# Patient Record
Sex: FEMALE | Race: WHITE | NOT HISPANIC OR LATINO | URBAN - METROPOLITAN AREA
[De-identification: names, ages, dates, MRNs, and addresses within clinical notes are randomized per-mention and may not be internally consistent; named-entity substitution may affect disease eponyms.]

---

## 2018-04-23 ENCOUNTER — OFFICE VISIT CONVERTED (OUTPATIENT)
Dept: SURGERY | Facility: CLINIC | Age: 63
End: 2018-04-23
Attending: SURGERY

## 2018-04-27 ENCOUNTER — OFFICE VISIT CONVERTED (OUTPATIENT)
Dept: ONCOLOGY | Facility: HOSPITAL | Age: 63
End: 2018-04-27
Attending: INTERNAL MEDICINE

## 2018-05-08 ENCOUNTER — OFFICE VISIT CONVERTED (OUTPATIENT)
Dept: ONCOLOGY | Facility: HOSPITAL | Age: 63
End: 2018-05-08
Attending: INTERNAL MEDICINE

## 2018-05-22 ENCOUNTER — OFFICE VISIT CONVERTED (OUTPATIENT)
Dept: ONCOLOGY | Facility: HOSPITAL | Age: 63
End: 2018-05-22
Attending: NURSE PRACTITIONER

## 2021-05-16 VITALS — HEIGHT: 63 IN | BODY MASS INDEX: 30.48 KG/M2 | RESPIRATION RATE: 14 BRPM | WEIGHT: 172 LBS

## 2021-05-28 VITALS
TEMPERATURE: 98.6 F | OXYGEN SATURATION: 98 % | TEMPERATURE: 98 F | DIASTOLIC BLOOD PRESSURE: 56 MMHG | DIASTOLIC BLOOD PRESSURE: 77 MMHG | BODY MASS INDEX: 29.47 KG/M2 | SYSTOLIC BLOOD PRESSURE: 100 MMHG | WEIGHT: 177.25 LBS | RESPIRATION RATE: 20 BRPM | HEIGHT: 64 IN | OXYGEN SATURATION: 98 % | WEIGHT: 172.62 LBS | BODY MASS INDEX: 31.41 KG/M2 | HEART RATE: 89 BPM | RESPIRATION RATE: 20 BRPM | SYSTOLIC BLOOD PRESSURE: 111 MMHG | HEIGHT: 63 IN | HEART RATE: 95 BPM

## 2021-05-28 VITALS
DIASTOLIC BLOOD PRESSURE: 66 MMHG | HEIGHT: 63 IN | TEMPERATURE: 98.5 F | BODY MASS INDEX: 29.3 KG/M2 | OXYGEN SATURATION: 99 % | HEART RATE: 98 BPM | SYSTOLIC BLOOD PRESSURE: 109 MMHG | WEIGHT: 165.34 LBS | RESPIRATION RATE: 16 BRPM

## 2021-05-28 NOTE — PROGRESS NOTES
Patient: JUNIOR LUJAN     Acct: [id number]     Report: #[id number]  UNIT #: [id number]     : 1955    Encounter Date:2018  PRIMARY CARE: ELEAZAR FRAIRE  ***Signed***  --------------------------------------------------------------------------------------------------------------------  Chief Complaint      2018      PANCREATIC CANCER, PANCRE            Current Plan      -Pancreatic adenocarcinoma      -T3N1M1. Clinically stage IV based on ascites.       -Will order paracentesis and PET/CT to complete staging.       -Orders placed for chemotherapy. Abraxane and Gemzar            -Interval Clinic Visit Changes      -Proceed with chemotherapy            Medical Evaluation of Cancer Associated Symptoms today      -Cancer associated pain      -Patient's pain has remained stable.      -Continue current pain regimen.      -Cancer associated muscle weakness      -Patient's weakness has remained stable.      -Continue close observation.      -Cancer associated fatigue      -Patient's fatigue has remained stable.      -Recommended exercise.      -Anxiety due to cancer      -Patient's anxiety is well controlled today.       -Continue current management.       -Today's Assessment      -ECOG 1.       -Patient's imaging exams, blood tests, physicians' notes, and any new findings     since our last visit were reviewed today to reassess patient's medical     treatment plan. .       -Old medical records were reviewed and summarized in chronological order in the     HPI today to maintain an updated medical record.       -Patient's radiology imaging tests from our last visit were reviewed     independently by me by direct visualization of the images.        -Patients current lab tests and medications were carefully reviewed to evaluate     patient's current treatment plan today.       -Patient was advised to call us right away if there are any new symptoms for an     urgent visit for further evaluation.  Patient voiced understanding and agreed to     do so.      Pancreatic cancer - C25.9            Notes      Discontinued Medications      * Megestrol Acetate (Megace ES) 625 MG/5 ML ORAL.SUSP: 625 MG PO QDAY #500       Instructions: SHAKE WELL      * Dexamethasone (Decadron) 4 MG TABLET: 4 MG PO ASDIR #30       Instructions: decadron 4mg tablets, take 2 tabs by mouth twice daily on days 2    , 3, and 4 after chemotherapy.      Time Spent:  > 50% /Coord [facility]      Patient Education Provided:  Yes            History and Present Illness      This is a very pleasant 62-year-old female with newly diagnosed stage IV     pancreatic cancer.            -March .  CT abdomen and pelvis showed a pancreatic mass measuring 3.4     cm.  Invasion into the vascular involvement.  Severe fatty changes in the     liver.  Increasing ascites.      -April .  CA-19-9 is 1150.            Vitals      Height 5 ft 3.78 in / 162 cm      Weight 172 lbs 9.923 oz / 78.3 kg      BSA 1.90 m2      BMI 29.8 kg/m2      Temperature 98 F / 36.67 C      Pulse 95      Respirations 20      Blood Pressure 100/56 Sitting, Left Arm      Pulse Oximetry 98%, RM AIR            Allergies      Coded Allergies:             PREGABALIN (Verified  Allergy, Severe, LIPS SWELL, 4/27/18)            Medications      Last Reconciled on 5/1/18 22:33 by PORSCHE GOMEZ MD      Prochlorperazine Maleate (Prochlorperazine Maleate) 10 Mg Tab      10 MG PO Q6H Y for NAUSEA AND/OR VOMITING, #60 TAB 3 Refills         Prov: Porsche Gomez         4/24/18       Ondansetron Hcl (Zofran ODT*) 8 Mg Tab.rapdis      8 MG PO Q8 for NAUSEA, #30 TAB.RAPDIS 3 Refills         Prov: Porsche Gomez         4/24/18       glyBURIDE (glyBURIDE) 5 Mg Tablet      5 MG PO QDAY, #30 TAB 0 Refills         Reported         4/23/18       Hydrocodone/Acetaminophen 5/325 MG (Hydrocodone/Acetaminophen 5/325 MG) 1 Each     Tablet      1 TAB PO Q6H Y for PAIN, TAB         Reported          4/23/18       Sucralfate (Sucralfate) 1 Gm Tablet      1 GM PO QIDAC, TAB         Reported         4/23/18       Promethazine Hcl (Phenergan*) 25 Mg Tablet      25 MG PO Q6H Y for NAUSEA, TAB 0 Refills         Reported         4/23/18       Montelukast Sodium (Singulair*) 10 Mg Tablet      10 MG PO QDAY, #30 TAB 0 Refills         Reported         4/23/18       Cholecalciferol (Vitamin D3*) 1,000 Units Capsule      1000 UNITS PO QDAY, #30 CAP 0 Refills         Reported         4/23/18       Insulin Lispro (HumaLOG VIAL*) 100 Units/Ml Vial      5 UNITS SUBQ TID INSULIN, #1 VIAL 0 Refills         Reported         4/23/18       (Iron )   No Conflict Check               Reported         4/23/18       Calcium/Vitamin D (Caltrate-D) 1 Each Tablet      600 EACH PO BID, #120 TAB         Reported         4/20/18       Potassium Chloride (Potassium Chloride) 10 Meq Capsule.er      10 MEQ PO BID, #60 CAP.ER 0 Refills         Reported         4/20/18       Furosemide* (Lasix*) 80 Mg Tablet      80 MG PO BID@09,17, #60 TAB 0 Refills         Reported         4/20/18       Pantoprazole (Protonix*) 40 Mg Tablet.dr      40 MG PO QDAY@07, #30 TAB 0 Refills         Reported         4/20/18       Lisinopril* (Lisinopril*) 30 Mg Tablet      30 MG PO QDAY, #30 TAB 0 Refills         Reported         4/20/18       Aspirin (Aspirin Baby *) 81 Mg Tab.chew      81 MG PO QDAY, #30 TAB.CHEW 0 Refills         Reported         4/20/18       LORazepam (LORazepam) 0.5 Mg Tablet      0.5 MG PO Q8H, TAB         Reported         4/20/18       Mirtazapine (Mirtazapine*) 15 Mg Tablet      15 MG PO HS, #30 TAB 0 Refills         Reported         4/20/18       Amitriptyline/Perphenazine (Amitriptyline/Perphenazine 25/2 Mg) 1 Each Tablet      1 TAB PO HS, #120 TAB         Reported         4/20/18       Simvastatin (Simvastatin*) 20 Mg Tablet      20 MG PO HS, #30 TAB 0 Refills         Reported         4/20/18       Carvedilol (Carvedilol) 12.5 Mg Tablet       12.5 MG PO BID, #60 TAB 0 Refills         Reported         4/20/18            General Information      Provider Not Found in Lookup:  ALFWINSOME      Provider Not Found in Lookup:  INDY FRAIRE            Pain and Fatigue Scales      Pain Assessment:           Experiencing any pain?:  No      Fatigue:           Experiencing any fatigue?:  Yes         Fatigue (0-10 scale):  6            PAST, FAMILY   Past Medical History      No Diabetes Type 1, Yes Diabetes Type 2, No Thyroid Disease, Yes COPD, No     Emphysema, Yes Hypertension, No Stroke, Yes High Cholesterol, Yes Heart Attack,     No Bleeding Condition, No Low or High RBC Count, No Low or High WBC Count, No     Low or High Platelet Coun, No Hepatitis, No Kidney Disease, Yes Depression, No     Alzheimer's Disease, No Mental Disease, No Seizures, No Arthritis, No     Osteoporosis, No Osteopenia, No Short of Air, Yes Sleep apnea, No Liver Disease    , No STD, No Enlarged Prostate, No Other      Hematology/oncology:  DENIES HX OF: Previous Treatment for CA, Anemia, Bladder     Cancer, Blood cancer, Brain cancer, Breast cancer, Cervical cancer, Coagulopathy    , Colorectal cancer, Endocrine cancer, Eye cancer, GI cancer,  cancer, Kidney     cancer, Leukemia, Leukocytosis, Leukopenia, Liver cancer, Lung cancer, Lymphoma    , Musculoskeletal cancer, Myeloma, Neurologic cancer, Oral cancer, Ovarian     cancer, Skin cancer, Stomach cancer, Thrombocytopenia, Thyroid cancer, Uterine     cancer, Other cancer history, Other hematologic history      Genetic/metabolic:  DENIES HX OF: Cystic fibrosis, Down syndrome, Other genetic     history, Other metabolic history            Past Surgical History      REPORTS HX OF: Cholecystectomy, Hysterectomy (PARTIAL), Biopsy (LIVER AND     PANCREAS, COLLAR BONE, HAIRLINE FRACTURE IN ANKLE), Other Past Surgical Hx (    TONSILECTOMY, TRIPLE BYPASS), DENIES HX OF: Cataract extraction, Thyroid surgery    , Lung biopsy, CABG  surgery, Coronary stent, Valve replacement, Appendectomy,     Splenectomy, Bladder surgery, Nephrectomy, Joint replacement, Frature repair,     Skin cancer removal, Melanoma excision, Spinal surgery, Breast biopsy,     Lumpectomy, Mastectomy, bilateral, Mastectomy, right, Mastectomy, left, Peg     Tube Placement, VAD Placement            Family History      REPORTS HX OF: Breast cancer (MOTHER'S SISTER), DENIES HX OF: Anemia, Blood     disorders, Blood Cancer, Cervical cancer, Coagulopathy, Colorectal cancer,     Endocrine Cancer, Eye Cancer, GI Cancer,  Cancer, Kidney Cancer, Leukemia,     Leukocytosis, Leukopenia, Liver Cancer, Lung cancer, Lymphoma, Melanoma,     Musculoskeletal Cancer, Myeloma, Neurologic Cancer, Oral Cancer, Ovarian cancer    , Prostate cancer, Skin Cancer, Stomach Cancer, Testicular Cancer,     Thrombocytopenia, Thyroid cancer, Uterine cancer, Other Cancer History, Other     Hematology History            Social History      No            Tobacco Use      Tobacco status:  Current every day smoker      Smoking packs/day:  1            Alcohol Use      Alcohol intake:  None            Substance Use      Substance use:  Denies use            REVIEW OF SYSTEMS      General:  Denies: Appetite change, Excessive sweating, Fatigue, Fever, Night     sweats, Weight gain, Weight loss, Other      Eyes:  Denies: Blurred vision, Corrective lenses, Diplopia, Eye irritation, Eye     pain, Eye redness, Spots in vision, Vision loss, Other      Ears, nose, mouth, throat:  Denies: Headache, Seizures, Visual Changes, Hearing     loss, Sinus Congestion, Hoarseness, Sore throat, Other      Cardiovascular:  Denies: Chest pain, Irregular heartbeat, Palpitations, Swollen     ankles/legs, Other      Respiratory:  Denies: Chest pain, Shortness of Air, Productive cough, Coughing     blood, Other      Gastrointestinal:  Complains of: Nausea (SOMETIMES.), Diarrhea, Denies: Vomiting    , Problem swallowing, Frequent  heartburn, Constipation, Tarry stools, Bloody     stools, Unable to control bowels, Other      Kidney/Bladder:  Denies: Painful Urination, Change in urinary stream, Blood in     urine, Incontinence, Frequent Urination, Decreased urine stream, Other      Musculoskeletal:  Denies: New Back pain, Leg Cramps, Painful Joints, Swollen     Joints, Muscle Pain, Muscle weakness, Other      Skin:  DENIES: Jaundice, Easy Bleeding, Lesions/changes in moles, Nail changes,     Skin Discoloration, Rash, Other      Neurological:  Denies: Dizziness, Fainting, Numbness\Tingling, Paralysis,     Seizures, Other      Psychiatric:  Complains of: AAO X 3, Denies: Anxiety, Panic attacks, Depression    , Memory loss, Other      Endocrine:  DiabetesThyroid DisorderOsteoporosisEndocrine Other      Hematologic/lymphatic:  Denies: Bruising, Bleeding, Enlarged Lymph Nodes,     Recurrent infections, Other      Reproductive:  Denies Pregnant, Denies Menopause, Denies Still Menstruating,     Denies Heavy Periods, Denies Other            Vitals            Vital Signs              Date Time  Temp Pulse Resp B/P (MAP) Pulse Ox O2 Delivery O2 Flow Rate FiO2             4/20/18 13:07 98.3 84  76/48 100 ROOM AIR              General Appearance:  Alert, Oriented X3, No acute distress      Eyes:  Anicteric Sclerae, Moist Conjunctiva      HEENT:  Orophraynx clear, No Erythema      Neck:  Supple, Full ROM      Respiratory:  CTAB, No Diminished Breath      Abdomen\Gastro:  Soft, No Masses      Cardio:  RRR, No Murmur, No, Peripheral Edema      Skin:  Normal Temperature, Normal Tone, No Rash, lesions, ulcers      Psychiatric:  Appropriate Affect, AAO x3      Neuro:  Cranial Nerve II-XII Inta, No Focal Sensory Deficit      Muscularskeletal:  Normal Gait and Station, Full ROM of extremeties      Extremities:  No Digital Cyanosis, No Digital Ischemia      Lymphatic:  No Cervical, No Supraclavicular, No Axillary            Lab Results      Laboratory Tests       4/20/18 14:17            PREVENTION      Hx Influenza Vaccination:  Yes (2017)      Date Influenza Vaccine Given:  Oct 1, 2017      Influenza Vaccine Declined:  No      2 or More Falls Past Year?:  No      Fall Past Year with Injury?:  No      Hx Pneumococcal Vaccination:  Yes (2017)      Encouraged to follow-up with:  PCP regarding preventative exams.      Chart initiated by      SHUKRI LEMUS MA                 Disclaimer: Converted document may not contain table formatting or lab diagrams. Please see [employer] [facility] for the authenticated document.

## 2021-05-28 NOTE — PROGRESS NOTES
Patient: JUNIOR LUJAN     Acct: [id number]     Report: #[id number]  UNIT #: [id number]     : 1955    Encounter Date:2018  PRIMARY CARE: ELEAZAR FRAIRE  ***Signed***  --------------------------------------------------------------------------------------------------------------------  Chief Complaint      May 8, 2018      PANCREATIC CANCER      STARTED FIRST CHEMO TREATMENT TODAY; PORT PLACED SINCE LAST VISIT            Current Plan      -Pancreatic adenocarcinoma      -T3N1M1. Clinically stage IV based on ascites.       -Will order paracentesis and PET/CT to complete staging.       -Doing well with chemotherapy.            -Interval Clinic Visit Changes      -Proceed with chemotherapy      -Negative toxicity evaluation            Medical Evaluation of Chemotherapy Associated Toxicities Today      -Chemotherapy associated fatigue      -Patient's weakness has remained stable.      -Recommended Exercise.       -Continue close observation.      -Chemotherapy associated nausea      -Patient's nausea has remained stable.      -Currently on Anti-Nausea medications.       -Continue close observation.      -Chemotherapy associated anemia      -Does not require blood transfusion on today's visit.       -Transfuse blood when hemoglobin is less than 7.      -Continue close observation.      -Chemotherapy associated thrombocytopenia      -Does not require platelet transfusion on today's visit.       -Transfuse platelet when platelet count < 20,000.       -Continue close observation.      -Anxiety due to cancer      -Patient's anxiety is well controlled today.       -Continue current management.       -Today's Assessment      -ECOG 1.       -Patient's imaging exams, blood tests, physicians' notes, and any new findings     since our last visit were reviewed today to reassess patient's medical     treatment plan. .       -Old medical records were reviewed and summarized in chronological order in the     HPI  today to maintain an updated medical record.       -Patient's radiology imaging tests from our last visit were reviewed     independently by me by direct visualization of the images.        -Patients current lab tests and medications were carefully reviewed to evaluate     patient's current treatment plan today. Proceed with chemotherapy plan.       -Patient was advised to call us right away if there are any new symptoms for an     urgent visit for further evaluation. Patient voiced understanding and agreed to     do so.      Time Spent:  > 50% /Coord Care      Patient Education Provided:  Yes            History and Present Illness      This is a very pleasant 62-year-old female with newly diagnosed stage IV     pancreatic cancer.            -March .  CT abdomen and pelvis showed a pancreatic mass measuring 3.4     cm.  Invasion into the vascular involvement.  Severe fatty changes in the     liver.  Increasing ascites.      -April .  CA-19-9 is 1150.      -May 8-2018.  CA-19-9 is 1837.  C1D1 Abraxane Gemzar            Vitals      Height 5 ft 2.99 in / 160.0 cm      Weight 177 lbs 3.997 oz / 80.4 kg      BSA 1.92 m2      BMI 31.4 kg/m2      Temperature 98.6 F / 37 C - Temporal      Pulse 89      Respirations 20      Blood Pressure 111/77 Sitting, Left Arm      Pulse Oximetry 98%, ROOM AIR            Allergies      Coded Allergies:             PREGABALIN (Verified  Allergy, Severe, LIPS SWELL, 5/8/18)            Medications      Last Reconciled on 5/10/18 18:46 by PORSCHE GOMEZ MD      Dexamethasone (Dexamethasone) 2 Mg Tab      4 MG PO BID, TAB         Reported         5/8/18       Loperamide HCl (Imodium) 2 Mg Capsule      2 MG PO ASDIR Y for DIARRHEA, CAP         Reported         5/2/18       Prochlorperazine Maleate (Prochlorperazine Maleate) 10 Mg Tab      10 MG PO Q6H Y for NAUSEA AND/OR VOMITING, #60 TAB 3 Refills         Prov: Porsche Gomez         4/24/18       Ondansetron Hcl (Zofran  ODT*) 8 Mg Tab.rapdis      8 MG PO Q8 for NAUSEA, #30 TAB.RAPDIS 3 Refills         Prov: Cem Gomez         4/24/18       Hydrocodone/Acetaminophen 5/325 MG (Hydrocodone/Acetaminophen 5/325 MG) 1 Each     Tablet      1 TAB PO Q6H Y for PAIN, TAB         Reported         4/23/18       Sucralfate (Sucralfate) 1 Gm Tablet      1 GM PO QIDAC, TAB         Reported         4/23/18       Promethazine Hcl (Phenergan*) 25 Mg Tablet      25 MG PO Q6H Y for NAUSEA, TAB 0 Refills         Reported         4/23/18       Montelukast Sodium (Singulair*) 10 Mg Tablet      10 MG PO QDAY, #30 TAB 0 Refills         Reported         4/23/18       Cholecalciferol (Vitamin D3*) 1,000 Units Capsule      1000 UNITS PO QDAY, #30 CAP 0 Refills         Reported         4/23/18       Insulin Lispro (HumaLOG VIAL*) 100 Units/Ml Vial      2-10 UNITS SUBQ QID INSULIN, #1 VIAL 0 Refills         Reported         4/23/18       (Iron )   No Conflict Check               Reported         4/23/18       Calcium/Vitamin D (Caltrate-D) 1 Each Tablet      600 EACH PO BID, #120 TAB         Reported         4/20/18       Potassium Chloride (Potassium Chloride) 10 Meq Capsule.er      10 MEQ PO BID, #60 CAP.ER 0 Refills         Reported         4/20/18       Furosemide* (Lasix*) 80 Mg Tablet      80 MG PO BID@09,17, #60 TAB 0 Refills         Reported         4/20/18       Pantoprazole (Protonix*) 40 Mg Tablet.dr      40 MG PO QDAY@07, #30 TAB 0 Refills         Reported         4/20/18       Aspirin (Aspirin Baby *) 81 Mg Tab.chew      81 MG PO QDAY, #30 TAB.CHEW 0 Refills         Reported         4/20/18       LORazepam (LORazepam) 0.5 Mg Tablet      0.5 MG PO Q8H, TAB         Reported         4/20/18       Mirtazapine (Mirtazapine*) 15 Mg Tablet      15 MG PO HS, #30 TAB 0 Refills         Reported         4/20/18       Amitriptyline/Perphenazine (Amitriptyline/Perphenazine 25/2 Mg) 1 Each Tablet      1 TAB PO HS, #120 TAB         Reported         4/20/18        Carvedilol (Carvedilol) 12.5 Mg Tablet      12.5 MG PO BID, #60 TAB 0 Refills         Reported         4/20/18            General Information      Provider Not Found in Lookup:  KARMEN      Provider Not Found in Lookup:  INDY FRAIRE            Pain and Fatigue Scales      Fatigue:           Experiencing any fatigue?:  Yes         Fatigue (0-10 scale):  5            PAST, FAMILY   Past Medical History      Past Medical History:  No Diabetes Type 1, Yes Diabetes Type 2, No Thyroid     Disease, Yes COPD, No Emphysema, Yes Hypertension, No Stroke, Yes High     Cholesterol, Yes Heart Attack, No Bleeding Condition, No Low or High RBC Count,     No Low or High WBC Count, No Low or High Platelet Coun, No Hepatitis, No Kidney     Disease, Yes Depression, No Alzheimer's Disease, No Mental Disease, No Seizures    , No Arthritis, No Osteoporosis, No Osteopenia, No Short of Air, Yes Sleep apnea    , No Liver Disease, No STD, No Enlarged Prostate, No Other      Hematology/oncology:  DENIES HX OF: Previous Treatment for CA, Anemia, Bladder     Cancer, Blood cancer, Brain cancer, Breast cancer, Cervical cancer, Coagulopathy    , Colorectal cancer, Endocrine cancer, Eye cancer, GI cancer,  cancer, Kidney     cancer, Leukemia, Leukocytosis, Leukopenia, Liver cancer, Lung cancer, Lymphoma    , Musculoskeletal cancer, Myeloma, Neurologic cancer, Oral cancer, Ovarian     cancer, Skin cancer, Stomach cancer, Thrombocytopenia, Thyroid cancer, Uterine     cancer, Other cancer history, Other hematologic history      Genetic/metabolic:  DENIES HX OF: Cystic fibrosis, Down syndrome, Other genetic     history, Other metabolic history            Past Surgical History      REPORTS HX OF: Cholecystectomy, Hysterectomy (PARTIAL), Biopsy (LIVER AND     PANCREAS, COLLAR BONE, HAIRLINE FRACTURE IN ANKLE), Other Past Surgical Hx (    TONSILECTOMY, TRIPLE BYPASS), DENIES HX OF: Cataract extraction, Thyroid surgery    , Lung biopsy,  CABG surgery, Coronary stent, Valve replacement, Appendectomy,     Splenectomy, Bladder surgery, Nephrectomy, Joint replacement, Frature repair,     Skin cancer removal, Melanoma excision, Spinal surgery, Breast biopsy,     Lumpectomy, Mastectomy, bilateral, Mastectomy, right, Mastectomy, left, Peg     Tube Placement, VAD Placement            Family History      REPORTS HX OF: Breast cancer (MOTHER'S SISTER), DENIES HX OF: Anemia, Blood     disorders, Blood Cancer, Cervical cancer, Coagulopathy, Colorectal cancer,     Endocrine Cancer, Eye Cancer, GI Cancer,  Cancer, Kidney Cancer, Leukemia,     Leukocytosis, Leukopenia, Liver Cancer, Lung cancer, Lymphoma, Melanoma,     Musculoskeletal Cancer, Myeloma, Neurologic Cancer, Oral Cancer, Ovarian cancer    , Prostate cancer, Skin Cancer, Stomach Cancer, Testicular Cancer,     Thrombocytopenia, Thyroid cancer, Uterine cancer, Other Cancer History, Other     Hematology History            Social History      Lives independently:  No            Tobacco Use      Tobacco status:  Current every day smoker      Smoking packs/day:  1            Alcohol Use      Alcohol intake:  None            Substance Use      Substance use:  Denies use            REVIEW OF SYSTEMS      General:  Complains of: Fatigue, Denies: Appetite change, Excessive sweating,     Fever, Night sweats, Weight gain, Weight loss, Other      Eyes:  Denies: Blurred vision, Corrective lenses, Diplopia, Eye irritation, Eye     pain, Eye redness, Spots in vision, Vision loss, Other      Ears, nose, mouth, throat:  Denies: Headache, Seizures, Visual Changes, Hearing     loss, Sinus Congestion, Hoarseness, Sore throat, Other      Cardiovascular:  Denies: Chest pain, Irregular heartbeat, Palpitations, Swollen     ankles/legs, Other      Respiratory:  Denies: Chest pain, Shortness of Air, Productive cough, Coughing     blood, Other      Gastrointestinal:  Denies: Nausea, Vomiting, Problem swallowing, Frequent      heartburn, Constipation, Diarrhea, Tarry stools, Bloody stools, Unable to     control bowels, Other      Kidney/Bladder:  Denies: Painful Urination, Change in urinary stream, Blood in     urine, Incontinence, Frequent Urination, Decreased urine stream, Other      Musculoskeletal:  Denies: New Back pain, Leg Cramps, Painful Joints, Swollen     Joints, Muscle Pain, Muscle weakness, Other      Skin:  DENIES: Jaundice, Easy Bleeding, Lesions/changes in moles, Nail changes,     Skin Discoloration, Rash, Other      Neurological:  Denies: Dizziness, Fainting, Numbness\Tingling, Paralysis,     Seizures, Other      Psychiatric:  Complains of: AAO X 3, Denies: Anxiety, Panic attacks, Depression    , Memory loss, Other      Endocrine:  DiabetesThyroid DisorderOsteoporosisEndocrine Other      Hematologic/lymphatic:  Denies: Bruising, Bleeding, Enlarged Lymph Nodes,     Recurrent infections, Other      Reproductive:  Denies Pregnant, Denies Menopause, Denies Still Menstruating,     Denies Heavy Periods, Denies Other            Vitals            Vital Signs              Date Time  Temp Pulse Resp B/P (MAP) Pulse Ox O2 Delivery O2 Flow Rate FiO2             4/27/18 13:03 98 95 20 100/56 98 RM AIR              General Appearance:  Alert, Oriented X3, No acute distress      Eyes:  Anicteric Sclerae, Moist Conjunctiva      HEENT:  Orophraynx clear, No Erythema      Neck:  Supple, Full ROM      Respiratory:  CTAB, No Diminished Breath      Abdomen\Gastro:  Soft, No Masses      Cardio:  RRR, No Murmur, No, Peripheral Edema      Skin:  Normal Temperature, Normal Tone, No Rash, lesions, ulcers      Psychiatric:  Appropriate Affect, AAO x3      Neuro:  Cranial Nerve II-XII Inta, No Focal Sensory Deficit      Muscularskeletal:  Normal Gait and Station, Full ROM of extremeties      Extremities:  No Digital Cyanosis, No Digital Ischemia      Lymphatic:  No Cervical, No Supraclavicular, No Axillary            Lab Results      Laboratory  Tests      4/20/18 14:17            PREVENTION      Hx Influenza Vaccination:  Yes      Date Influenza Vaccine Given:  Oct 1, 2017      Influenza Vaccine Declined:  No      2 or More Falls Past Year?:  No      Fall Past Year with Injury?:  No      Hx Pneumococcal Vaccination:  Yes      Encouraged to follow-up with:  PCP regarding preventative exams.      Chart initiated by      JUAN POST CMA                 Disclaimer: Converted document may not contain table formatting or lab diagrams. Please see [employer] for the authenticated document.

## 2021-05-28 NOTE — PROGRESS NOTES
Patient: JUNIOR LUJAN     Acct: [id number]     Report: #[id number]  UNIT #: [id number]     : 1955    Encounter Date:2018  PRIMARY CARE: ELEAZAR FRAIRE  ***Signed***  --------------------------------------------------------------------------------------------------------------------  Chief Complaint      May 22, 2018      PANCREATIC CANCER      STARTED FIRST CHEMO TREATMENT TODAY; PORT PLACED SINCE LAST VISIT            Current Plan      -Pancreatic adenocarcinoma      -T3N1M1. Clinically stage IV based on ascites.       -Will order paracentesis and PET/CT to complete staging.       -C1D8 held due to hospitalization            -Interval Clinic Visit Changes      -Proceed with C1D8 delayed at this time      -2 IV runs Magnesium to manage count 1.18      -Utilize Lomotil and Imodium to manage diarrhea      -Take stool sample to [facility] to test for C. diff and WBC      -Follow-up in 1 week for assess and ongoing POC            Medical Evaluation of Chemotherapy Associated Toxicities Today      -Chemotherapy associated fatigue      -Patient's weakness has remained stable.      -Recommended Exercise.       -Continue close observation.      -Chemotherapy associated nausea      -Patient's nausea has remained stable.      -Currently on Anti-Nausea medications.       -Continue close observation.      -Chemotherapy associated anemia      -Does not require blood transfusion on today's visit.       -Transfuse blood when hemoglobin is less than 7.      -Continue close observation.      -Chemotherapy associated thrombocytopenia      -Does not require platelet transfusion on today's visit.       -Transfuse platelet when platelet count < 20,000.       -Continue close observation.      -Anxiety due to cancer      -Patient's anxiety is well controlled today.       -Continue current management.       -Today's Assessment      -ECOG 1.       -Patient's imaging exams, blood tests, physicians' notes, and any new findings      since our last visit were reviewed today to reassess patient's medical     treatment plan. .       -Old medical records were reviewed and summarized in chronological order in the     HPI today to maintain an updated medical record.       -Patient's radiology imaging tests from our last visit were reviewed     independently by me by direct visualization of the images.        -Patients current lab tests and medications were carefully reviewed to evaluate     patient's current treatment plan today. Proceed with chemotherapy plan.       -Patient was advised to call us right away if there are any new symptoms for an     urgent visit for further evaluation. Patient voiced understanding and agreed to     do so.      Pancreatic cancer       Malignant neoplasm of head of pancreas - C25.0       Pancreatic malignancy location: head of pancreas            Diarrhea       Diarrhea, unspecified type - R19.7       Diarrhea type: unspecified type            Fatigue       Neoplastic malignant related fatigue - R53.0       Fatigue type: due to neoplasm            Notes      Changed Medications      * Potassium Chloride 10 MEQ CAPSULE.ER:       From: 10 MEQ PO BID #60       To: 20 MEQ PO BID #60      New Diagnostics      * Ca19-9, As Soon As Possible       Dx: Pancreatic cancer - C25.9      * Stool For Wbcs, Stat       Dx: Pancreatic cancer - C25.9      * Clostridium Diff Cul, Stat       Dx: Pancreatic cancer - C25.9      * Magnesium Serum, As Soon As Possible      Intensive Monitor for Toxicity:  Labs Reviewed, Chemo Orders Reviewd, Meds\    Narcotics Reviewed      Labs Reviewed During Visit?:  Yes      Time Spent:  > 50% /Coord Care      Patient Education Provided:  Yes      ECOG Score:  1            History and Present Illness      This is a very pleasant 62-year-old female with newly diagnosed stage IV     pancreatic cancer.            -March .  CT abdomen and pelvis showed a pancreatic mass measuring 3.4     cm.   Invasion into the vascular involvement.  Severe fatty changes in the     liver.  Increasing ascites.      -April .  CA-19-9 is 1150.      -April 26, 2018.  Paracentesis-2 liters clear, yellow fluid aspirated.       -May 8-2018.  CA-19-9 is 1837.  C1D1 Abraxane Gemzar      -May 10, 2018.  Paracentesis-2.6 liters clear, yellow fluid aspirated.      -May 22, 2018.  Presents for C1D8 delayed.  Patient reports was in hospital     last week  for dehydration and unable to receive chemotherapy.  Since that time     experiencing significant amounts of diarrhea.  Utilizing Lomotil and Imodium     and stools have decreased to 4-5 per day.  Nutrition is poor.  Has lost     approximately 10 pounds since initial treatment.  Significant education     provided regarding nutritional needs and requirements to regain strength.      Fatigue is severe; informed is related to loss of nutrients with diarrhea and     poor nutritional intake.  Mother reports she has fallen on 2 occasions.  LFA     with skin tear.  Reports some cramping pain in abdomen with diarrhea episodes.      Encouraged to utilize anti-emetics and pain med as needed to manage symptoms.      BLE with +2 pitting edema noted.  Again educated the need for protein intake.      She has compression stockings and will attempt to put them on.  Denies fever,     cough or SOA at this time. Magnesium level, 1.18. Receiving Magnesium runs x 2     day.            Vitals      Height 5 ft 2.99 in / 160 cm      Weight 165 lbs 5.520 oz / 75 kg      BSA 1.85 m2      BMI 29.3 kg/m2      Temperature 98.5 F / 36.94 C - Temporal      Pulse 98      Respirations 16      Blood Pressure 109/66 Sitting      Pulse Oximetry 99%, RM AIR            Allergies      Coded Allergies:             PREGABALIN (Verified  Allergy, Severe, LIPS SWELL, 5/22/18)            Medications      Last Reconciled on 5/22/18 11:18 by HENRY OLIVARES      Diphenoxylate/Atropine (Diphenoxylate/Atropine) 1 Each  Tablet      2.5 MG PO QID Y for DIARRHEA, TAB         Reported         5/22/18       Potassium Chloride (Potassium Chloride) 10 Meq Capsule.er      20 MEQ PO BID, #60 CAP.ER 3 Refills         Prov: HENRY OLIVARES onc         5/22/18       Insulin Degludec (Tresiba Flextouch U-100) 100 Unit/1 Ml Insuln.pen      10 UNITS SUBQ QDAY for 30 Days, #1 INSULN.PEN         Reported         5/22/18       Simvastatin (Simvastatin*) 20 Mg Tablet      20 MG PO HS, #30 TAB 0 Refills         Reported         5/22/18       Dexamethasone (Dexamethasone) 2 Mg Tab      4 MG PO BID, TAB         Reported         5/8/18       Loperamide HCl (Imodium) 2 Mg Capsule      2 MG PO ASDIR Y for DIARRHEA, CAP         Reported         5/2/18       Prochlorperazine Maleate (Prochlorperazine Maleate) 10 Mg Tab      10 MG PO Q6H Y for NAUSEA AND/OR VOMITING, #60 TAB 3 Refills         Prov: Cem Gomez         4/24/18       Ondansetron Hcl (Zofran ODT*) 8 Mg Tab.rapdis      8 MG PO Q8 for NAUSEA, #30 TAB.RAPDIS 3 Refills         Prov: Cem Gomez         4/24/18       Hydrocodone/Acetaminophen 5/325 MG (Hydrocodone/Acetaminophen 5/325 MG) 1 Each     Tablet      1 TAB PO Q6H Y for PAIN, TAB         Reported         4/23/18       Sucralfate (Sucralfate) 1 Gm Tablet      1 GM PO QIDAC, TAB         Reported         4/23/18       Promethazine Hcl (Phenergan*) 25 Mg Tablet      25 MG PO Q6H Y for NAUSEA, TAB 0 Refills         Reported         4/23/18       Montelukast Sodium (Singulair*) 10 Mg Tablet      10 MG PO QDAY, #30 TAB 0 Refills         Reported         4/23/18       Cholecalciferol (Vitamin D3*) 1,000 Units Capsule      1000 UNITS PO QDAY, #30 CAP 0 Refills         Reported         4/23/18       Insulin Lispro (HumaLOG VIAL*) 100 Units/Ml Vial      2-10 UNITS SUBQ QID INSULIN, #1 VIAL 0 Refills         Reported         4/23/18       (Iron )   No Conflict Check               Reported         4/23/18       Calcium/Vitamin D (Caltrate-D) 1  Each Tablet      600 EACH PO BID, #120 TAB         Reported         4/20/18       Furosemide* (Lasix*) 80 Mg Tablet      80 MG PO BID@09,17, #60 TAB 0 Refills         Reported         4/20/18       Pantoprazole (Protonix*) 40 Mg Tablet.dr      40 MG PO QDAY@07, #30 TAB 0 Refills         Reported         4/20/18       Aspirin (Aspirin Baby *) 81 Mg Tab.chew      81 MG PO QDAY, #30 TAB.CHEW 0 Refills         Reported         4/20/18       LORazepam (LORazepam) 0.5 Mg Tablet      0.5 MG PO Q8H, TAB         Reported         4/20/18       Mirtazapine (Mirtazapine*) 15 Mg Tablet      15 MG PO HS, #30 TAB 0 Refills         Reported         4/20/18       Amitriptyline/Perphenazine (Amitriptyline/Perphenazine 25/2 Mg) 1 Each Tablet      1 TAB PO HS, #120 TAB         Reported         4/20/18       Carvedilol (Carvedilol) 12.5 Mg Tablet      6.25 MG PO BID, #60 TAB 0 Refills         Reported         4/20/18            General Information      Provider Not Found in Lookup:  KARMEN      Provider Not Found in Lookup:  INDY FRAIRE            Pain and Fatigue Scales      Pain Assessment:           Experiencing any pain?:  No      Fatigue:           Experiencing any fatigue?:  Yes         Fatigue (0-10 scale):  10            Chemo Status      On Oral Chemotherapy?:  No            Other      Clinical Trial Participant?:  No            PAST, FAMILY   Past Medical History      Past Medical History:  No Diabetes Type 1, Yes Diabetes Type 2, No Thyroid     Disease, Yes COPD, No Emphysema, Yes Hypertension, No Stroke, Yes High     Cholesterol, Yes Heart Attack, No Bleeding Condition, No Low or High RBC Count,     No Low or High WBC Count, No Low or High Platelet Coun, No Hepatitis, No Kidney     Disease, Yes Depression, No Alzheimer's Disease, No Mental Disease, No Seizures    , No Arthritis, No Osteoporosis, No Osteopenia, No Short of Air, Yes Sleep apnea    , No Liver Disease, No STD, No Enlarged Prostate, No Other       Hematology/oncology:  DENIES HX OF: Previous Treatment for CA, Anemia, Bladder     Cancer, Blood cancer, Brain cancer, Breast cancer, Cervical cancer, Coagulopathy    , Colorectal cancer, Endocrine cancer, Eye cancer, GI cancer,  cancer, Kidney     cancer, Leukemia, Leukocytosis, Leukopenia, Liver cancer, Lung cancer, Lymphoma    , Musculoskeletal cancer, Myeloma, Neurologic cancer, Oral cancer, Ovarian     cancer, Skin cancer, Stomach cancer, Thrombocytopenia, Thyroid cancer, Uterine     cancer, Other cancer history, Other hematologic history      Genetic/metabolic:  DENIES HX OF: Cystic fibrosis, Down syndrome, Other genetic     history, Other metabolic history            Past Surgical History      REPORTS HX OF: Cholecystectomy, Hysterectomy (PARTIAL), Biopsy (LIVER AND     PANCREAS, COLLAR BONE, HAIRLINE FRACTURE IN ANKLE), Other Past Surgical Hx (    TONSILECTOMY, TRIPLE BYPASS), DENIES HX OF: Cataract extraction, Thyroid surgery    , Lung biopsy, CABG surgery, Coronary stent, Valve replacement, Appendectomy,     Splenectomy, Bladder surgery, Nephrectomy, Joint replacement, Frature repair,     Skin cancer removal, Melanoma excision, Spinal surgery, Breast biopsy,     Lumpectomy, Mastectomy, bilateral, Mastectomy, right, Mastectomy, left, Peg     Tube Placement, VAD Placement            Family History      REPORTS HX OF: Breast cancer (MOTHER'S SISTER), DENIES HX OF: Anemia, Blood     disorders, Blood Cancer, Cervical cancer, Coagulopathy, Colorectal cancer,     Endocrine Cancer, Eye Cancer, GI Cancer,  Cancer, Kidney Cancer, Leukemia,     Leukocytosis, Leukopenia, Liver Cancer, Lung cancer, Lymphoma, Melanoma,     Musculoskeletal Cancer, Myeloma, Neurologic Cancer, Oral Cancer, Ovarian cancer    , Prostate cancer, Skin Cancer, Stomach Cancer, Testicular Cancer,     Thrombocytopenia, Thyroid cancer, Uterine cancer, Other Cancer History, Other     Hematology History            Social History      Lives  independently:  Yes            Tobacco Use      Tobacco status:  Current every day smoker      Smoking packs/day:  1            Alcohol Use      Alcohol intake:  None            Substance Use      Substance use:  Denies use            REVIEW OF SYSTEMS      General:  Complains of: Fatigue, Denies: Appetite change, Excessive sweating,     Fever, Night sweats, Weight gain, Weight loss, Other      Eyes:  Denies: Blurred vision, Corrective lenses, Diplopia, Eye irritation, Eye     pain, Eye redness, Spots in vision, Vision loss, Other      Ears, nose, mouth, throat:  Denies: Headache, Seizures, Visual Changes, Hearing     loss, Sinus Congestion, Hoarseness, Sore throat, Other      Cardiovascular:  Complains of: Swollen ankles/legs, Denies: Chest pain,     Irregular heartbeat, Palpitations, Other      Respiratory:  Denies: Chest pain, Shortness of Air, Productive cough, Coughing     blood, Other      Gastrointestinal:  Complains of: Diarrhea, Denies: Nausea, Vomiting, Problem     swallowing, Frequent heartburn, Constipation, Tarry stools, Bloody stools,     Unable to control bowels, Other      Kidney/Bladder:  Denies: Painful Urination, Change in urinary stream, Blood in     urine, Incontinence, Frequent Urination, Decreased urine stream, Other      Musculoskeletal:  Denies: New Back pain, Leg Cramps, Painful Joints, Swollen     Joints, Muscle Pain, Muscle weakness, Other      Skin:  DENIES: Jaundice, Easy Bleeding, Lesions/changes in moles, Nail changes,     Skin Discoloration, Rash, Other      Neurological:  Denies: Dizziness, Fainting, Numbness\Tingling, Paralysis,     Seizures, Other      Psychiatric:  Complains of: AAO X 3, Denies: Anxiety, Panic attacks, Depression    , Memory loss, Other      Endocrine:  Denies DiabetesThyroid DisorderOsteoporosisEndocrine Other      Hematologic/lymphatic:  Denies: Bruising, Bleeding, Enlarged Lymph Nodes,     Recurrent infections, Other      Reproductive:  Complains of Menopause,  Denies Pregnant, Denies Still     Menstruating, Denies Heavy Periods, Denies Other            Vitals            Vital Signs              Date Time  Temp Pulse Resp B/P (MAP) Pulse Ox O2 Delivery O2 Flow Rate FiO2             5/8/18 09:20 98.6 89 20 111/77 98 ROOM AIR              General Appearance:  Alert, Oriented X3, No acute distress, Obese      Eyes:  Anicteric Sclerae, Moist Conjunctiva      HEENT:  Orophraynx clear, No Erythema, Pallor, No Thrush      Neck:  Supple, Full ROM      Respiratory:  CTAB, No Diminished Breath, No Rales, No Crackels, No Stridor, No     Rhonchi      Breast\Chest:  Symmetrical      Abdomen\Gastro:  Soft, No Masses      Cardio:  RRR, No Murmur, No, Peripheral Edema      Skin:  Normal Temperature, Normal Tone, No Rash, lesions, ulcers      Psychiatric:  Appropriate Affect, Intact Judgement, AAO x3      Neuro:  Cranial Nerve II-XII Inta, No Focal Sensory Deficit      Genitourinary:  No Manrique Catheter      Muscularskeletal:  Normal Gait and Station, Full ROM of extremeties, No Full     muscle strength\tone      Extremities:  No Digital Cyanosis, No Digital Ischemia, Normal Gait and station    , Weakness      Lymphatic:  No Cervical, No Supraclavicular, No Infraclavicular, No Axillary            Lab Results      Laboratory Tests      5/8/18 09:00            PREVENTION      Hx Influenza Vaccination:  Yes      Date Influenza Vaccine Given:  Oct 1, 2017      Influenza Vaccine Declined:  No      2 or More Falls Past Year?:  No      Fall Past Year with Injury?:  No      Hx Pneumococcal Vaccination:  Yes      Encouraged to follow-up with:  PCP regarding preventative exams.      Chart initiated by      SHUKRI LEMUS MA            Electronically signed by HENRY OLIVARES  05/22/2018 11:18  Electronically signed by JASON FRIEDMAN  02/26/2020 08:59       Disclaimer: Converted document may not contain table formatting or lab diagrams. Please see [employer] [facility] for the  authenticated document.